# Patient Record
Sex: MALE | Race: BLACK OR AFRICAN AMERICAN
[De-identification: names, ages, dates, MRNs, and addresses within clinical notes are randomized per-mention and may not be internally consistent; named-entity substitution may affect disease eponyms.]

---

## 2021-02-25 ENCOUNTER — HOSPITAL ENCOUNTER (INPATIENT)
Dept: HOSPITAL 35 - ER | Age: 66
LOS: 5 days | Discharge: HOME | DRG: 438 | End: 2021-03-02
Attending: INTERNAL MEDICINE | Admitting: INTERNAL MEDICINE
Payer: COMMERCIAL

## 2021-02-25 VITALS — DIASTOLIC BLOOD PRESSURE: 62 MMHG | SYSTOLIC BLOOD PRESSURE: 115 MMHG

## 2021-02-25 VITALS — BODY MASS INDEX: 30.92 KG/M2 | HEIGHT: 70 IN | WEIGHT: 216 LBS

## 2021-02-25 VITALS — SYSTOLIC BLOOD PRESSURE: 111 MMHG | DIASTOLIC BLOOD PRESSURE: 58 MMHG

## 2021-02-25 VITALS — SYSTOLIC BLOOD PRESSURE: 113 MMHG | DIASTOLIC BLOOD PRESSURE: 66 MMHG

## 2021-02-25 VITALS — SYSTOLIC BLOOD PRESSURE: 102 MMHG | DIASTOLIC BLOOD PRESSURE: 49 MMHG

## 2021-02-25 DIAGNOSIS — K29.80: ICD-10-CM

## 2021-02-25 DIAGNOSIS — Z88.8: ICD-10-CM

## 2021-02-25 DIAGNOSIS — Z79.899: ICD-10-CM

## 2021-02-25 DIAGNOSIS — K57.32: ICD-10-CM

## 2021-02-25 DIAGNOSIS — Z71.6: ICD-10-CM

## 2021-02-25 DIAGNOSIS — Z79.82: ICD-10-CM

## 2021-02-25 DIAGNOSIS — D69.6: ICD-10-CM

## 2021-02-25 DIAGNOSIS — E11.42: ICD-10-CM

## 2021-02-25 DIAGNOSIS — Z95.1: ICD-10-CM

## 2021-02-25 DIAGNOSIS — Z87.311: ICD-10-CM

## 2021-02-25 DIAGNOSIS — K21.9: ICD-10-CM

## 2021-02-25 DIAGNOSIS — E78.5: ICD-10-CM

## 2021-02-25 DIAGNOSIS — K85.90: Primary | ICD-10-CM

## 2021-02-25 DIAGNOSIS — K74.69: ICD-10-CM

## 2021-02-25 DIAGNOSIS — I10: ICD-10-CM

## 2021-02-25 DIAGNOSIS — R65.11: ICD-10-CM

## 2021-02-25 DIAGNOSIS — I25.10: ICD-10-CM

## 2021-02-25 DIAGNOSIS — R18.8: ICD-10-CM

## 2021-02-25 DIAGNOSIS — K52.9: ICD-10-CM

## 2021-02-25 DIAGNOSIS — I25.2: ICD-10-CM

## 2021-02-25 DIAGNOSIS — Z88.6: ICD-10-CM

## 2021-02-25 DIAGNOSIS — J44.9: ICD-10-CM

## 2021-02-25 LAB
ALBUMIN SERPL-MCNC: 3.1 G/DL (ref 3.4–5)
ALT SERPL-CCNC: 28 U/L (ref 16–63)
ANION GAP SERPL CALC-SCNC: 11 MMOL/L (ref 7–16)
AST SERPL-CCNC: 37 U/L (ref 15–37)
BASOPHILS NFR BLD AUTO: 0.6 % (ref 0–2)
BILIRUB SERPL-MCNC: 4.1 MG/DL (ref 0.2–1)
BILIRUB UR-MCNC: NEGATIVE MG/DL
BUN SERPL-MCNC: 19 MG/DL (ref 7–18)
CALCIUM SERPL-MCNC: 9.2 MG/DL (ref 8.5–10.1)
CHLORIDE SERPL-SCNC: 99 MMOL/L (ref 98–107)
CO2 SERPL-SCNC: 24 MMOL/L (ref 21–32)
COLOR UR: YELLOW
CREAT SERPL-MCNC: 1.1 MG/DL (ref 0.7–1.3)
EOSINOPHIL NFR BLD: 0.1 % (ref 0–3)
ERYTHROCYTE [DISTWIDTH] IN BLOOD BY AUTOMATED COUNT: 15.1 % (ref 10.5–14.5)
FOLATE SERPL-MCNC: 8.5 NG/ML (ref 8.6–58.9)
GLUCOSE SERPL-MCNC: 174 MG/DL (ref 74–106)
GRANULOCYTES NFR BLD MANUAL: 77.2 % (ref 36–66)
HCT VFR BLD CALC: 50.6 % (ref 42–52)
HGB BLD-MCNC: 16.4 GM/DL (ref 14–18)
KETONES UR STRIP-MCNC: NEGATIVE MG/DL
LIPASE: 6355 U/L (ref 73–393)
LYMPHOCYTES NFR BLD AUTO: 11.2 % (ref 24–44)
MCH RBC QN AUTO: 28.7 PG (ref 26–34)
MCHC RBC AUTO-ENTMCNC: 32.3 G/DL (ref 28–37)
MCV RBC: 88.8 FL (ref 80–100)
MONOCYTES NFR BLD: 10.9 % (ref 1–8)
NEUTROPHILS # BLD: 12 THOU/UL (ref 1.4–8.2)
OPIATES UR-MCNC: POSITIVE NG/ML
PLATELET # BLD: 130 THOU/UL (ref 150–400)
POTASSIUM SERPL-SCNC: 4 MMOL/L (ref 3.5–5.1)
PROT SERPL-MCNC: 6.6 G/DL (ref 6.4–8.2)
RBC # BLD AUTO: 5.7 MIL/UL (ref 4.5–6)
RBC # UR STRIP: (no result) /UL
SODIUM SERPL-SCNC: 134 MMOL/L (ref 136–145)
SP GR UR STRIP: <= 1.005 (ref 1–1.03)
TROPONIN I SERPL-MCNC: <0.06 NG/ML (ref ?–0.06)
URINE CLARITY: CLEAR
URINE GLUCOSE-RANDOM*: NEGATIVE
URINE LEUKOCYTES-REFLEX: NEGATIVE
URINE NITRITE-REFLEX: NEGATIVE
URINE PROTEIN (DIPSTICK): (no result)
UROBILINOGEN UR STRIP-ACNC: 0.2 E.U./DL (ref 0.2–1)
VIT B12 SERPL-MCNC: 702 PG/ML (ref 193–986)
WBC # BLD AUTO: 15.5 THOU/UL (ref 4–11)

## 2021-02-25 PROCEDURE — 10045: CPT

## 2021-02-25 PROCEDURE — 10047: CPT

## 2021-02-26 VITALS — DIASTOLIC BLOOD PRESSURE: 59 MMHG | SYSTOLIC BLOOD PRESSURE: 106 MMHG

## 2021-02-26 VITALS — DIASTOLIC BLOOD PRESSURE: 75 MMHG | SYSTOLIC BLOOD PRESSURE: 135 MMHG

## 2021-02-26 VITALS — DIASTOLIC BLOOD PRESSURE: 74 MMHG | SYSTOLIC BLOOD PRESSURE: 124 MMHG

## 2021-02-26 VITALS — SYSTOLIC BLOOD PRESSURE: 127 MMHG | DIASTOLIC BLOOD PRESSURE: 71 MMHG

## 2021-02-26 VITALS — SYSTOLIC BLOOD PRESSURE: 107 MMHG | DIASTOLIC BLOOD PRESSURE: 64 MMHG

## 2021-02-26 VITALS — SYSTOLIC BLOOD PRESSURE: 116 MMHG | DIASTOLIC BLOOD PRESSURE: 64 MMHG

## 2021-02-26 LAB
ANION GAP SERPL CALC-SCNC: 9 MMOL/L (ref 7–16)
BASOPHILS NFR BLD AUTO: 0.1 % (ref 0–2)
BUN SERPL-MCNC: 20 MG/DL (ref 7–18)
CALCIUM SERPL-MCNC: 8.4 MG/DL (ref 8.5–10.1)
CHLORIDE SERPL-SCNC: 105 MMOL/L (ref 98–107)
CO2 SERPL-SCNC: 24 MMOL/L (ref 21–32)
CREAT SERPL-MCNC: 1.1 MG/DL (ref 0.7–1.3)
EOSINOPHIL NFR BLD: 0 % (ref 0–3)
ERYTHROCYTE [DISTWIDTH] IN BLOOD BY AUTOMATED COUNT: 15.8 % (ref 10.5–14.5)
GLUCOSE SERPL-MCNC: 112 MG/DL (ref 74–106)
GRANULOCYTES NFR BLD MANUAL: 76 % (ref 36–66)
HCT VFR BLD CALC: 48 % (ref 42–52)
HGB BLD-MCNC: 15.5 GM/DL (ref 14–18)
IRON SERPL-MCNC: 75 UG/DL (ref 65–175)
LYMPHOCYTES NFR BLD AUTO: 11.7 % (ref 24–44)
MAGNESIUM SERPL-MCNC: 1.5 MG/DL (ref 1.8–2.4)
MCH RBC QN AUTO: 29.2 PG (ref 26–34)
MCHC RBC AUTO-ENTMCNC: 32.4 G/DL (ref 28–37)
MCV RBC: 90.1 FL (ref 80–100)
MONOCYTES NFR BLD: 12.2 % (ref 1–8)
NEUTROPHILS # BLD: 12.2 THOU/UL (ref 1.4–8.2)
PLATELET # BLD: 102 THOU/UL (ref 150–400)
POTASSIUM SERPL-SCNC: 3.7 MMOL/L (ref 3.5–5.1)
RBC # BLD AUTO: 5.33 MIL/UL (ref 4.5–6)
SAO2 % BLD FROM PO2: 35 % (ref 20–39)
SODIUM SERPL-SCNC: 138 MMOL/L (ref 136–145)
TIBC SERPL-MCNC: 215 UG/DL (ref 250–450)
WBC # BLD AUTO: 16.1 THOU/UL (ref 4–11)

## 2021-02-26 NOTE — NUR
ASSUMED CARE OF PT FROM ED AT SHIFT CHANGE. PT IS ALERT AND ORIENTED X4 AND
LETS NEEDS BE KNOWN. PT IS UP AD JOSE. PT WAS ORIENTED TO THE UNIT AND HIS
ROOM. PT WAS ABLE TO ANSWER ALL ADMISSION RELATED QUESTIONS AND SIGN CONSENTS.
PT RAN SINUS RHYTHM ON TELE. PT COMPLAINED OF NAUSEA AND PRN ANTI-NAUSEA MED
PROVIDED. NO EMISIS REPORTED OR WITNESSED. MED REC COMPLETED. ORDERS RECEIVED
AND STARTED. PT WAS ABLE TO GET COMFORTABLE AND SLEEP PART OF THE SHIFT. VSS
AND NO S/S OF ACUTE DISTRESS. WILL CONTINUE TO MONITOR.

## 2021-02-26 NOTE — NUR
PT ADMITTED RELATED TO DUODENITIS AND PANCREATITIS. CM REVIEWED CHART AND
SPOKE WITH CARE TEAM. CM CALLED AND SPOKE WITH PT'S SPOUSE WHO WAS AT BEDSIDE.
SHE INDICATED THAT THEY RESIDE IN A HOUSE TOGETHER WITH NO STEPS TO ENTER AND
NO STEPS INSIDE. SHE INDICATED THAT PT HAD BEEN INDEPENDENT WITH GAIT AND ADLS
PTA. SHE INDICATED THAT THEY HAVE DME AT THE HOME FROM FAMILY MEM[employer]ERS. SPOUSE
INDICATED THAT SHE ANTICIPATES PT RETURNING HOME WITH NO NEEDS ONCE MEDICALLY
STABLE. PT'S PCP IS DR. LAKISHA HYLTON. CM ABLE TO FOLLOW SHOULD ANY DC NEEDS
ARISE. GI CONSULTED AND PT ON IV ZOSYN.

## 2021-02-26 NOTE — NUR
ASSUMED PT CARE AT 0700. PT ALERT X ORIENTED X4. PT ON STANDBY ASST (DUE TO
IV). NO C/O NAUSEA/VOMITING. ON ROOM AIR. FALL PRECT IN PLACE. WILL CALL FOR
HELP. IV LEFT AC. NPO. MRI SCHEDULED FOR 11AM. PT'S WIFE IN THE ROOM. WILL
CONT TO MONITOR.

## 2021-02-27 VITALS — DIASTOLIC BLOOD PRESSURE: 65 MMHG | SYSTOLIC BLOOD PRESSURE: 110 MMHG

## 2021-02-27 VITALS — DIASTOLIC BLOOD PRESSURE: 71 MMHG | SYSTOLIC BLOOD PRESSURE: 114 MMHG

## 2021-02-27 VITALS — SYSTOLIC BLOOD PRESSURE: 130 MMHG | DIASTOLIC BLOOD PRESSURE: 65 MMHG

## 2021-02-27 VITALS — SYSTOLIC BLOOD PRESSURE: 146 MMHG | DIASTOLIC BLOOD PRESSURE: 68 MMHG

## 2021-02-27 LAB
ALBUMIN SERPL-MCNC: 2.3 G/DL (ref 3.4–5)
ALT SERPL-CCNC: 17 U/L (ref 30–65)
ANION GAP SERPL CALC-SCNC: 7 MMOL/L (ref 7–16)
AST SERPL-CCNC: 32 U/L (ref 15–37)
BASOPHILS NFR BLD AUTO: 0.2 % (ref 0–2)
BILIRUB SERPL-MCNC: 4.2 MG/DL (ref 0.2–1)
BUN SERPL-MCNC: 35 MG/DL (ref 7–18)
CALCIUM SERPL-MCNC: 8.4 MG/DL (ref 8.5–10.1)
CERULOPLASMIN SERPL-MCNC: 21.4 MG/DL (ref 16–31)
CHLORIDE SERPL-SCNC: 106 MMOL/L (ref 98–107)
CO2 SERPL-SCNC: 25 MMOL/L (ref 21–32)
CREAT SERPL-MCNC: 1.4 MG/DL (ref 0.7–1.3)
EOSINOPHIL NFR BLD: 0.2 % (ref 0–3)
ERYTHROCYTE [DISTWIDTH] IN BLOOD BY AUTOMATED COUNT: 15.4 % (ref 10.5–14.5)
GLUCOSE SERPL-MCNC: 97 MG/DL (ref 74–106)
GRANULOCYTES NFR BLD MANUAL: 71.9 % (ref 36–66)
HCT VFR BLD CALC: 45.2 % (ref 42–52)
HCV AB SER IA-ACNC: <0.1 (ref 0–0.9)
HGB BLD-MCNC: 14.4 GM/DL (ref 14–18)
LIPASE: 314 U/L (ref 73–393)
LYMPHOCYTES NFR BLD AUTO: 13.1 % (ref 24–44)
MAGNESIUM SERPL-MCNC: 2.2 MG/DL (ref 1.8–2.4)
MCH RBC QN AUTO: 29 PG (ref 26–34)
MCHC RBC AUTO-ENTMCNC: 31.9 G/DL (ref 28–37)
MCV RBC: 90.8 FL (ref 80–100)
MONOCYTES NFR BLD: 14.6 % (ref 1–8)
NEUTROPHILS # BLD: 9.9 THOU/UL (ref 1.4–8.2)
PLATELET # BLD: 93 THOU/UL (ref 150–400)
POTASSIUM SERPL-SCNC: 4 MMOL/L (ref 3.5–5.1)
PROT SERPL-MCNC: 5.4 G/DL (ref 6.4–8.2)
RBC # BLD AUTO: 4.98 MIL/UL (ref 4.5–6)
SODIUM SERPL-SCNC: 138 MMOL/L (ref 136–145)
WBC # BLD AUTO: 13.8 THOU/UL (ref 4–11)

## 2021-02-27 NOTE — NUR
Pt assumed care. Pt is awake, alert and agitated. Pt is up ad francisco.
Pt denies nausea and
vomiting during day shift. No c/o pain. Received pt
on NPO and advance to clear liquid diet this am and well
controlled blood glucose. IV access on L AC. Accucheck ACHS and
Lower extremities compression
device SCD is on. PT on RA.
Educated about medication and use
of call light when needed. Pt on bed, bed on the lowest position and call
light within the reach. Will continue to monitor per POC.

## 2021-02-27 NOTE — NUR
PT CARE ASSUMED WITH PT IN BED WATCHING TV.PT IS A/O X4.PT IS UP AD JOSE AND
EDUCATED TO CALL FOR HELP WHEN NEEDED.PT APPEARED TO BE IN NO ACUTE
DISTRESS.PT IS DENIED N/V /D DURING SHIFT.IV ACCESS ON RT AC WITH NS AT
75CC/HR.WILL CONTINUE TO MONITOR PER POC

## 2021-02-28 VITALS — DIASTOLIC BLOOD PRESSURE: 50 MMHG | SYSTOLIC BLOOD PRESSURE: 86 MMHG

## 2021-02-28 VITALS — DIASTOLIC BLOOD PRESSURE: 77 MMHG | SYSTOLIC BLOOD PRESSURE: 128 MMHG

## 2021-02-28 VITALS — DIASTOLIC BLOOD PRESSURE: 70 MMHG | SYSTOLIC BLOOD PRESSURE: 132 MMHG

## 2021-02-28 VITALS — DIASTOLIC BLOOD PRESSURE: 72 MMHG | SYSTOLIC BLOOD PRESSURE: 116 MMHG

## 2021-02-28 LAB
ANION GAP SERPL CALC-SCNC: 6 MMOL/L (ref 7–16)
BUN SERPL-MCNC: 34 MG/DL (ref 7–18)
CALCIUM SERPL-MCNC: 8.3 MG/DL (ref 8.5–10.1)
CHLORIDE SERPL-SCNC: 106 MMOL/L (ref 98–107)
CO2 SERPL-SCNC: 26 MMOL/L (ref 21–32)
CREAT SERPL-MCNC: 1.2 MG/DL (ref 0.7–1.3)
ERYTHROCYTE [DISTWIDTH] IN BLOOD BY AUTOMATED COUNT: 15.3 % (ref 10.5–14.5)
GLUCOSE SERPL-MCNC: 167 MG/DL (ref 74–106)
HCT VFR BLD CALC: 40.9 % (ref 42–52)
HGB BLD-MCNC: 13.3 GM/DL (ref 14–18)
MCH RBC QN AUTO: 29.2 PG (ref 26–34)
MCHC RBC AUTO-ENTMCNC: 32.5 G/DL (ref 28–37)
MCV RBC: 90 FL (ref 80–100)
PLATELET # BLD: 94 THOU/UL (ref 150–400)
POTASSIUM SERPL-SCNC: 3.8 MMOL/L (ref 3.5–5.1)
RBC # BLD AUTO: 4.54 MIL/UL (ref 4.5–6)
SODIUM SERPL-SCNC: 138 MMOL/L (ref 136–145)
WBC # BLD AUTO: 8.8 THOU/UL (ref 4–11)

## 2021-02-28 NOTE — NUR
ASSUMED CARE OF PT AT 1900HRS. PT AOX4 AND LETS NEEDS BE KNOWN. PT IS UP AD
JOSE. ASSESSMENT CHARTED. ABX TREATMENT CONTINUED. PT DENIED PAIN OR SOA. PT
REPORTED SOME NAUSEA AND WAS TREATED WITH PRN MEDS. NO EMISIS NOTED THIS
SHIFT. PT RAN SR ON TELE. PT WAS ABLE TO GET COMFORTABLE AND SLEEP PART OF THE
SHIFT. VSS AND NO S/S OF ACUTE DISTRESS. WILL CONTINUE TO MONITOR.

## 2021-02-28 NOTE — NUR
Assumed pt care this am, vs stable, diet and medications are tolerated well.
Pt was frustrated stating that the doctor does not listen to him and pt does
not believe that his current situation is caused by past alcohol intake. POC
followed, pain managed with medications.

## 2021-03-01 VITALS — DIASTOLIC BLOOD PRESSURE: 86 MMHG | SYSTOLIC BLOOD PRESSURE: 164 MMHG

## 2021-03-01 VITALS — DIASTOLIC BLOOD PRESSURE: 80 MMHG | SYSTOLIC BLOOD PRESSURE: 140 MMHG

## 2021-03-01 VITALS — DIASTOLIC BLOOD PRESSURE: 87 MMHG | SYSTOLIC BLOOD PRESSURE: 167 MMHG

## 2021-03-01 LAB
ALBUMIN SERPL-MCNC: 2.1 G/DL (ref 3.4–5)
ALT SERPL-CCNC: 31 U/L (ref 30–65)
AST SERPL-CCNC: 54 U/L (ref 15–37)
BILIRUB DIRECT SERPL-MCNC: 3 MG/DL
BILIRUB SERPL-MCNC: 5 MG/DL (ref 0.2–1)
PROT SERPL-MCNC: 4.9 G/DL (ref 6.4–8.2)
SMOOTH MUSCLE ANTIBODY: 6 UNITS (ref 0–19)

## 2021-03-01 NOTE — NUR
PT TRANSFERRING TO BEDSIDE COMMODE INDEPENDENTLY AND IS TOLERATING WELL.
MORPHINE PROVIDING PAIN RELIEF.  ZOFRAN NAUSEA RELIEF.  RESTING COMFORTABLY.
NO NEEDS VOICED.  CALL LIGHT WITHIN REACH.  FREQUENT OBSERVATION.

## 2021-03-01 NOTE — NUR
Received awake on bed. Due medications given as prescribed. On telemetry-
stopped and made MS, monitor returned. On room air.
On clear liquids- tolerating well; no nausea, no vomiting and no abdominal
pain noted. On blood sugar monitoring, taken and recorded accordingly; with
sliding scale insulin ordered. Continent of bowel and bladder, able to use
urinal and bedside commode. With NS at 75cc/hr, infusing well at L AC; on IV
antibiotics. Up ad francisco. No complains of pain made during assessment. Assisted
in ADLs. Pt very anxious about plan of care and diagnosis- physician made
aware, will see patient.
To continue monitoring patient.

## 2021-03-01 NOTE — NUR
PT HAD MRCP 2/26. PT'S DIET BEING ADVANCED. GI SPOKE WITH PT ABOUT FOLLOW UP
AT  AND POSSIBLE NEED FOR EUS IN FUTURE. CM FOLLOWING REGARIDNG DC PLANNING.

## 2021-03-02 VITALS — SYSTOLIC BLOOD PRESSURE: 164 MMHG | DIASTOLIC BLOOD PRESSURE: 86 MMHG

## 2021-03-02 LAB
ALBUMIN SERPL-MCNC: 1.9 G/DL (ref 3.4–5)
ALT SERPL-CCNC: 34 U/L (ref 30–65)
ANION GAP SERPL CALC-SCNC: 8 MMOL/L (ref 7–16)
APTT BLD: 32.9 SECONDS (ref 24.5–32.8)
AST SERPL-CCNC: 71 U/L (ref 15–37)
BILIRUB SERPL-MCNC: 4.6 MG/DL (ref 0.2–1)
BUN SERPL-MCNC: 22 MG/DL (ref 7–18)
CALCIUM SERPL-MCNC: 8.1 MG/DL (ref 8.5–10.1)
CHLORIDE SERPL-SCNC: 106 MMOL/L (ref 98–107)
CO2 SERPL-SCNC: 26 MMOL/L (ref 21–32)
CREAT SERPL-MCNC: 1 MG/DL (ref 0.7–1.3)
ERYTHROCYTE [DISTWIDTH] IN BLOOD BY AUTOMATED COUNT: 15.3 % (ref 10.5–14.5)
GLUCOSE SERPL-MCNC: 130 MG/DL (ref 74–106)
HCT VFR BLD CALC: 42.1 % (ref 42–52)
HGB BLD-MCNC: 13.7 GM/DL (ref 14–18)
INR PPP: 1.4
MAGNESIUM SERPL-MCNC: 1.8 MG/DL (ref 1.8–2.4)
MCH RBC QN AUTO: 29.3 PG (ref 26–34)
MCHC RBC AUTO-ENTMCNC: 32.5 G/DL (ref 28–37)
MCV RBC: 90.1 FL (ref 80–100)
PLATELET # BLD: 108 THOU/UL (ref 150–400)
POTASSIUM SERPL-SCNC: 3.4 MMOL/L (ref 3.5–5.1)
PROT SERPL-MCNC: 5.1 G/DL (ref 6.4–8.2)
PROTHROMBIN TIME: 15.6 SECONDS (ref 9.3–11.4)
RBC # BLD AUTO: 4.67 MIL/UL (ref 4.5–6)
SODIUM SERPL-SCNC: 140 MMOL/L (ref 136–145)
WBC # BLD AUTO: 7.7 THOU/UL (ref 4–11)

## 2021-03-02 NOTE — NUR
CARE TEAM INDICATED THAT PT IS MEDICALLY STABLE TO DISCHARGE HOME THIS DAY. PT
IS TO DC HOME TO SELF CARE. PT HAS TRANSPORT HOME. NO OTHER CM INTERVENTION
INDICATED. CASE CLOSED.

## 2021-03-02 NOTE — NUR
PT CARE ASSUMED WITH PT IN BED WATCHING TV.PT IS A/O X4.PT IS UP AD JOSE AND
EDUCATED TO CALL FOR HELP.PT DENIED NAUSEA AND VOMITING AND DIARRHEA.PT IS
WANTS TO GO HOME AND PLAN TO GO HOME IF TOLERATE DIET AS ADVANCED AND FOLLOW
UP WITH [facility].WILL CONTINUE TO MONITOR POC

## 2021-03-02 NOTE — NUR
Assumed pt care this am Vs stable, was upset since dc orders were filed
around 8:45 but completed past 12. Diet and medications are tolerated well. Vs
stable, IV removed early as per pt request some IV medications not given
d/t rout unvailability. POC followed, no signs or verbalizations of distress
noted. Wife at the bed side, IV removed earlier. DC instruction given to the
pt and the wife. Pt is now dc, wheeled to the ER, wife picked up the pt. Pt is
now dc